# Patient Record
Sex: FEMALE | Race: WHITE | NOT HISPANIC OR LATINO | Employment: OTHER | URBAN - METROPOLITAN AREA
[De-identification: names, ages, dates, MRNs, and addresses within clinical notes are randomized per-mention and may not be internally consistent; named-entity substitution may affect disease eponyms.]

---

## 2022-02-15 ENCOUNTER — OUTSIDE PLACE OF SERVICE (OUTPATIENT)
Dept: GASTROENTEROLOGY | Facility: CLINIC | Age: 82
End: 2022-02-15
Payer: MEDICARE

## 2022-02-15 PROCEDURE — 99223 1ST HOSP IP/OBS HIGH 75: CPT | Mod: ,,, | Performed by: INTERNAL MEDICINE

## 2022-02-15 PROCEDURE — 99223 PR INITIAL HOSPITAL CARE,LEVL III: ICD-10-PCS | Mod: ,,, | Performed by: INTERNAL MEDICINE

## 2022-02-16 ENCOUNTER — OUTSIDE PLACE OF SERVICE (OUTPATIENT)
Dept: GASTROENTEROLOGY | Facility: CLINIC | Age: 82
End: 2022-02-16
Payer: MEDICARE

## 2022-02-16 PROCEDURE — 43239 EGD BIOPSY SINGLE/MULTIPLE: CPT | Mod: ,,, | Performed by: INTERNAL MEDICINE

## 2022-02-16 PROCEDURE — 43239 PR EGD, FLEX, W/BIOPSY, SGL/MULTI: ICD-10-PCS | Mod: ,,, | Performed by: INTERNAL MEDICINE

## 2022-02-18 PROCEDURE — G0180 MD CERTIFICATION HHA PATIENT: HCPCS | Mod: ,,, | Performed by: INTERNAL MEDICINE

## 2022-02-18 PROCEDURE — G0180 PR HOME HEALTH MD CERTIFICATION: ICD-10-PCS | Mod: ,,, | Performed by: INTERNAL MEDICINE

## 2022-02-21 ENCOUNTER — TELEPHONE (OUTPATIENT)
Dept: PRIMARY CARE CLINIC | Facility: CLINIC | Age: 82
End: 2022-02-21
Payer: MEDICARE

## 2022-02-21 ENCOUNTER — TELEPHONE (OUTPATIENT)
Dept: GASTROENTEROLOGY | Facility: CLINIC | Age: 82
End: 2022-02-21
Payer: MEDICARE

## 2022-02-21 NOTE — TELEPHONE ENCOUNTER
Spoke with Mery @ [facility] and Mery stated that will be faxing over the documentation that Dr Huffman requested                                ----- Message from Molly Huffman MD sent at 2/18/2022  9:00 AM CST -----  Which hospital was she admitted to? I just need her medical records, labs, medications, etc  ----- Message -----  From: Ximena Sanders MA  Sent: 2/18/2022   8:51 AM CST  To: Molly Huffman MD    Pt was referred to [facility] from [facility] [place] and the granddaughter is keeping the appointment so Mery called from [facility] to see if there is paperwork or documentation that Dr Huffman would need from them   ----- Message -----  From: Ariadne Gilmore MA  Sent: 2/17/2022   3:55 PM CST  To: Ariadne Gilmore MA, Ximena Sanders MA      ----- Message -----  From: Joie Henao  Sent: 2/17/2022  12:41 PM CST  To: Nathanael Barnes Staff    Mery calling from [facility] need to speak to nurse regarding completion of patient  paper work. Call back number [phone]. Tks

## 2022-03-18 ENCOUNTER — OFFICE VISIT (OUTPATIENT)
Dept: PRIMARY CARE CLINIC | Facility: CLINIC | Age: 82
End: 2022-03-18
Payer: MEDICARE

## 2022-03-18 ENCOUNTER — TELEPHONE (OUTPATIENT)
Dept: PRIMARY CARE CLINIC | Facility: CLINIC | Age: 82
End: 2022-03-18

## 2022-03-18 VITALS
HEIGHT: 61 IN | SYSTOLIC BLOOD PRESSURE: 90 MMHG | BODY MASS INDEX: 23.79 KG/M2 | DIASTOLIC BLOOD PRESSURE: 50 MMHG | HEART RATE: 43 BPM | OXYGEN SATURATION: 97 % | WEIGHT: 126 LBS

## 2022-03-18 DIAGNOSIS — D64.9 ANEMIA, UNSPECIFIED TYPE: ICD-10-CM

## 2022-03-18 DIAGNOSIS — Z79.899 ON AMIODARONE THERAPY: ICD-10-CM

## 2022-03-18 DIAGNOSIS — J30.9 ALLERGIC RHINITIS, UNSPECIFIED SEASONALITY, UNSPECIFIED TRIGGER: ICD-10-CM

## 2022-03-18 DIAGNOSIS — Z78.0 POST-MENOPAUSAL: ICD-10-CM

## 2022-03-18 DIAGNOSIS — K25.7 CHRONIC GASTRIC ULCER WITHOUT HEMORRHAGE AND WITHOUT PERFORATION: ICD-10-CM

## 2022-03-18 DIAGNOSIS — I42.9 CARDIOMYOPATHY, UNSPECIFIED TYPE: ICD-10-CM

## 2022-03-18 DIAGNOSIS — E87.6 HYPOKALEMIA: Primary | ICD-10-CM

## 2022-03-18 PROCEDURE — 99204 OFFICE O/P NEW MOD 45 MIN: CPT | Mod: S$GLB,,, | Performed by: INTERNAL MEDICINE

## 2022-03-18 PROCEDURE — 99204 PR OFFICE/OUTPT VISIT, NEW, LEVL IV, 45-59 MIN: ICD-10-PCS | Mod: S$GLB,,, | Performed by: INTERNAL MEDICINE

## 2022-03-18 RX ORDER — AMIODARONE HYDROCHLORIDE 200 MG/1
200 TABLET ORAL 2 TIMES DAILY
COMMUNITY
Start: 2022-02-17 | End: 2022-03-19 | Stop reason: SDUPTHER

## 2022-03-18 RX ORDER — PANTOPRAZOLE SODIUM 40 MG/1
40 TABLET, DELAYED RELEASE ORAL DAILY
COMMUNITY
Start: 2022-02-17 | End: 2022-03-19 | Stop reason: SDUPTHER

## 2022-03-18 RX ORDER — METOPROLOL SUCCINATE 100 MG/1
100 TABLET, EXTENDED RELEASE ORAL 2 TIMES DAILY
COMMUNITY
Start: 2021-10-10 | End: 2022-03-19 | Stop reason: SDUPTHER

## 2022-03-18 RX ORDER — MONTELUKAST SODIUM 10 MG/1
10 TABLET ORAL DAILY
COMMUNITY
End: 2022-03-19 | Stop reason: SDUPTHER

## 2022-03-18 RX ORDER — ROSUVASTATIN CALCIUM 10 MG/1
10 TABLET, COATED ORAL DAILY
COMMUNITY
End: 2022-03-19 | Stop reason: SDUPTHER

## 2022-03-18 RX ORDER — FUROSEMIDE 40 MG/1
40 TABLET ORAL DAILY
COMMUNITY
Start: 2022-03-11 | End: 2022-03-18 | Stop reason: ALTCHOICE

## 2022-03-18 NOTE — TELEPHONE ENCOUNTER
LVM with Patricia, nurse for Dr Berkowitz. Advised her of the BP reading today and that Dr Huffman decreased the metoprolol to once daily. Asked to call back with any questions.

## 2022-03-19 LAB
ALBUMIN SERPL-MCNC: 2.5 G/DL (ref 3.6–5.1)
ALBUMIN/GLOB SERPL: 0.7 (CALC) (ref 1–2.5)
ALP SERPL-CCNC: 197 U/L (ref 37–153)
ALT SERPL-CCNC: 13 U/L (ref 6–29)
AST SERPL-CCNC: 24 U/L (ref 10–35)
BASOPHILS # BLD AUTO: 50 CELLS/UL (ref 0–200)
BASOPHILS NFR BLD AUTO: 0.6 %
BILIRUB SERPL-MCNC: 0.5 MG/DL (ref 0.2–1.2)
BUN SERPL-MCNC: 8 MG/DL (ref 7–25)
BUN/CREAT SERPL: 6 (CALC) (ref 6–22)
CALCIUM SERPL-MCNC: 8.4 MG/DL (ref 8.6–10.4)
CHLORIDE SERPL-SCNC: 93 MMOL/L (ref 98–110)
CO2 SERPL-SCNC: 34 MMOL/L (ref 20–32)
CREAT SERPL-MCNC: 1.35 MG/DL (ref 0.6–0.88)
EOSINOPHIL # BLD AUTO: 116 CELLS/UL (ref 15–500)
EOSINOPHIL NFR BLD AUTO: 1.4 %
ERYTHROCYTE [DISTWIDTH] IN BLOOD BY AUTOMATED COUNT: 23.4 % (ref 11–15)
GLOBULIN SER CALC-MCNC: 3.5 G/DL (CALC) (ref 1.9–3.7)
GLUCOSE SERPL-MCNC: 86 MG/DL (ref 65–139)
HCT VFR BLD AUTO: 38.6 % (ref 35–45)
HGB BLD-MCNC: 11.7 G/DL (ref 11.7–15.5)
LYMPHOCYTES # BLD AUTO: 988 CELLS/UL (ref 850–3900)
LYMPHOCYTES NFR BLD AUTO: 11.9 %
MCH RBC QN AUTO: 24.9 PG (ref 27–33)
MCHC RBC AUTO-ENTMCNC: 30.3 G/DL (ref 32–36)
MCV RBC AUTO: 82.1 FL (ref 80–100)
MONOCYTES # BLD AUTO: 606 CELLS/UL (ref 200–950)
MONOCYTES NFR BLD AUTO: 7.3 %
NEUTROPHILS # BLD AUTO: 6540 CELLS/UL (ref 1500–7800)
NEUTROPHILS NFR BLD AUTO: 78.8 %
PLATELET # BLD AUTO: 245 THOUSAND/UL (ref 140–400)
PMV BLD REES-ECKER: 11 FL (ref 7.5–12.5)
POTASSIUM SERPL-SCNC: 2.9 MMOL/L (ref 3.5–5.3)
PROT SERPL-MCNC: 6 G/DL (ref 6.1–8.1)
RBC # BLD AUTO: 4.7 MILLION/UL (ref 3.8–5.1)
SERVICE CMNT-IMP: ABNORMAL
SODIUM SERPL-SCNC: 138 MMOL/L (ref 135–146)
TSH SERPL-ACNC: 2.41 MIU/L (ref 0.4–4.5)
WBC # BLD AUTO: 8.3 THOUSAND/UL (ref 3.8–10.8)

## 2022-03-19 RX ORDER — PANTOPRAZOLE SODIUM 40 MG/1
40 TABLET, DELAYED RELEASE ORAL DAILY
Qty: 30 TABLET | Refills: 1 | Status: SHIPPED | OUTPATIENT
Start: 2022-03-19 | End: 2022-05-27 | Stop reason: SDUPTHER

## 2022-03-19 RX ORDER — MONTELUKAST SODIUM 10 MG/1
10 TABLET ORAL DAILY
Qty: 30 TABLET | Refills: 2 | Status: SHIPPED | OUTPATIENT
Start: 2022-03-19

## 2022-03-19 RX ORDER — METOPROLOL SUCCINATE 100 MG/1
100 TABLET, EXTENDED RELEASE ORAL DAILY
Qty: 30 TABLET | Refills: 2 | Status: SHIPPED | OUTPATIENT
Start: 2022-03-19

## 2022-03-19 RX ORDER — POTASSIUM CHLORIDE 1.5 G/1.58G
20 POWDER, FOR SOLUTION ORAL DAILY
Qty: 30 PACKET | Refills: 1 | Status: SHIPPED | OUTPATIENT
Start: 2022-03-19 | End: 2022-04-11 | Stop reason: ALTCHOICE

## 2022-03-19 RX ORDER — ROSUVASTATIN CALCIUM 10 MG/1
10 TABLET, COATED ORAL DAILY
Qty: 90 TABLET | Refills: 3 | Status: SHIPPED | OUTPATIENT
Start: 2022-03-19

## 2022-03-19 RX ORDER — AMIODARONE HYDROCHLORIDE 200 MG/1
200 TABLET ORAL 2 TIMES DAILY
Qty: 60 TABLET | Refills: 2 | Status: SHIPPED | OUTPATIENT
Start: 2022-03-19 | End: 2022-05-27

## 2022-04-11 ENCOUNTER — OFFICE VISIT (OUTPATIENT)
Dept: PRIMARY CARE CLINIC | Facility: CLINIC | Age: 82
End: 2022-04-11
Payer: MEDICARE

## 2022-04-11 ENCOUNTER — EXTERNAL HOME HEALTH (OUTPATIENT)
Dept: HOME HEALTH SERVICES | Facility: HOSPITAL | Age: 82
End: 2022-04-11
Payer: MEDICARE

## 2022-04-11 VITALS
OXYGEN SATURATION: 99 % | WEIGHT: 114 LBS | BODY MASS INDEX: 21.52 KG/M2 | DIASTOLIC BLOOD PRESSURE: 60 MMHG | SYSTOLIC BLOOD PRESSURE: 90 MMHG | HEIGHT: 61 IN | HEART RATE: 44 BPM

## 2022-04-11 DIAGNOSIS — I42.9 CARDIOMYOPATHY, UNSPECIFIED TYPE: Primary | ICD-10-CM

## 2022-04-11 DIAGNOSIS — R41.89 COGNITIVE IMPAIRMENT: ICD-10-CM

## 2022-04-11 DIAGNOSIS — Z79.899 ON AMIODARONE THERAPY: ICD-10-CM

## 2022-04-11 PROCEDURE — 99214 PR OFFICE/OUTPT VISIT, EST, LEVL IV, 30-39 MIN: ICD-10-PCS | Mod: S$GLB,,, | Performed by: INTERNAL MEDICINE

## 2022-04-11 PROCEDURE — 99214 OFFICE O/P EST MOD 30 MIN: CPT | Mod: S$GLB,,, | Performed by: INTERNAL MEDICINE

## 2022-04-11 NOTE — PROGRESS NOTES
Subjective:      Patient ID: Kanchan Hernandez is a 82 y.o. female.    Chief Complaint: Follow-up (The patient has seen Dr Berkowitz.  MOCA done. A lot of forgetting. Can recall long term childhood but cannot recall most recent activity. )    HPI     Patient here with her granddaughter for follow up. Her K was discontinued and she was started on aldactone by Dr Berkowitz. Granddaughter is concerned patient has dementia. She seems to repeat herself. Patient has afib and is on amiodarone. Granddaughter states pulse is usually normal and patient is currently asymptomatic      Review of Systems   Constitutional: Positive for weight loss. Negative for chills and fever.        About 10 lbs since last visit, possibly from edema   Respiratory: Negative for cough, shortness of breath and wheezing.    Cardiovascular: Positive for leg swelling. Negative for chest pain and palpitations.   Genitourinary: Negative for dysuria, frequency and urgency.   Musculoskeletal: Negative for falls and joint pain.   Skin: Positive for rash.   Neurological: Negative for dizziness and headaches.   Psychiatric/Behavioral: Positive for memory loss.        Cognitive testing done in clinic     Objective:     Physical Exam  Vitals reviewed.   Constitutional:       Appearance: Normal appearance.   HENT:      Head: Normocephalic.   Eyes:      Extraocular Movements: Extraocular movements intact.      Conjunctiva/sclera: Conjunctivae normal.      Pupils: Pupils are equal, round, and reactive to light.   Cardiovascular:      Rate and Rhythm: Regular rhythm. Bradycardia present.   Pulmonary:      Effort: Pulmonary effort is normal.      Breath sounds: Normal breath sounds.   Abdominal:      General: Bowel sounds are normal.   Musculoskeletal:      Right lower leg: Edema present.      Left lower leg: Edema present.   Skin:     General: Skin is warm.      Capillary Refill: Capillary refill takes less than 2 seconds.      Comments: Stage 1 sacral pressure ulcer  "  Neurological:      General: No focal deficit present.      Mental Status: She is alert and oriented to person, place, and time.   Psychiatric:         Mood and Affect: Mood normal.        BP 90/60 (BP Location: Left arm, Patient Position: Sitting, BP Method: Small (Automatic))   Pulse (!) 44   Ht 5' 1" (1.549 m)   Wt 51.7 kg (114 lb)   SpO2 99%   BMI 21.54 kg/m²     Assessment:       ICD-10-CM ICD-9-CM   1. Cardiomyopathy, unspecified type  I42.9 425.4   2. On amiodarone therapy  Z79.899 V58.69   3. Cognitive impairment  R41.89 294.9       Plan:     Medication List with Changes/Refills   Current Medications    AMIODARONE (PACERONE) 200 MG TAB    Take 1 tablet (200 mg total) by mouth 2 (two) times daily.    METOPROLOL SUCCINATE (TOPROL-XL) 100 MG 24 HR TABLET    Take 1 tablet (100 mg total) by mouth once daily.    MONTELUKAST (SINGULAIR) 10 MG TABLET    Take 1 tablet (10 mg total) by mouth once daily.    PANTOPRAZOLE (PROTONIX) 40 MG TABLET    Take 1 tablet (40 mg total) by mouth once daily.    ROSUVASTATIN (CRESTOR) 10 MG TABLET    Take 1 tablet (10 mg total) by mouth once daily.   Discontinued Medications    POTASSIUM CHLORIDE (KLOR-CON) 20 MEQ PACK    Take 20 mEq by mouth once daily. for 30 doses        Cardiomyopathy, unspecified type    On amiodarone therapy    Cognitive impairment           Patient has a score of 25/30 on mayito cognitive test, main weakness is short term recall. She is alert and oriented x 3. I am not certain medications would improve any of her symptoms and advised all medications have side effects. We will repeat cognitive testing in 6 months. I advised granddaughter that it all depends on her and patient as far as placement is concerned. Currently granddaughter is ok with taking care of patient. Obviously patient cannot drive and should have someone take care of her medications    Recommended change in position frequently and to purchase an inflatable donut to avoid putting much " pressure on sacral area. Wrote down sensi care and advised to apply. She is already using zinc oxide cream for dermatitis from the depends that she wears       Future Appointments   Date Time Provider Department Center   6/22/2022 12:00 PM Destiney Painter MD [facility]  [place]   7/11/2022 10:00 AM Molly Huffman MD [facility]  [address]

## 2022-04-18 ENCOUNTER — TELEPHONE (OUTPATIENT)
Dept: PRIMARY CARE CLINIC | Facility: CLINIC | Age: 82
End: 2022-04-18
Payer: MEDICARE

## 2022-04-18 NOTE — TELEPHONE ENCOUNTER
Argenis is stating that they found out a lot of her dishware has high toxic levels of lead paint. She states it seems this has a lot to do with the symptoms she is having, instead of them thinking it was dementia. Argenis did inquire about doing blood work to check her levels. She also is asking should she be seen sooner since her next appt is not until July. I advised her you were out for the day and would return on tomorrow, answering her questions.  She then asked about it being urgent to be seen. I did advise her to get her checked out at the ER this evening. She states her grandmother is not wanting to go to ER's. I reiterated that you would review this on tomorrow due to being out this evening.          ----- Message from Pallavi La sent at 4/18/2022  2:40 PM CDT -----  Contact: Patient Granddaughter called-Argenis  Patient granddaughter would like for the nurse to call her she has some questions regarding the   Patient      Call back #  [phone] ask for Argenis

## 2022-04-19 DIAGNOSIS — D64.9 ANEMIA, UNSPECIFIED TYPE: Primary | ICD-10-CM

## 2022-04-20 LAB
CHOLEST SERPL-MSCNC: 110 MG/DL (ref 0–199)
HDLC SERPL-MCNC: 65 >39
LDLC SERPL CALC-MCNC: 32 <100
TRIGL SERPL-MCNC: 66 MG/DL (ref 0–149)

## 2022-04-22 ENCOUNTER — DOCUMENT SCAN (OUTPATIENT)
Dept: HOME HEALTH SERVICES | Facility: HOSPITAL | Age: 82
End: 2022-04-22
Payer: MEDICARE

## 2022-04-26 ENCOUNTER — DOCUMENT SCAN (OUTPATIENT)
Dept: HOME HEALTH SERVICES | Facility: HOSPITAL | Age: 82
End: 2022-04-26
Payer: MEDICARE

## 2022-04-26 ENCOUNTER — PATIENT OUTREACH (OUTPATIENT)
Dept: ADMINISTRATIVE | Facility: HOSPITAL | Age: 82
End: 2022-04-26
Payer: MEDICARE

## 2022-04-26 ENCOUNTER — TELEPHONE (OUTPATIENT)
Dept: PRIMARY CARE CLINIC | Facility: CLINIC | Age: 82
End: 2022-04-26

## 2022-04-26 NOTE — TELEPHONE ENCOUNTER
"[facility] with a mailbox that states "Moira." I did not leave the patient's info due to the message below stating it was a call from Isatu. I did advise to call the number back here and she can speak with Ritika or Dina if this is for an appt only.     ----- Message from Rupesh Angel sent at 4/26/2022  1:14 PM CDT -----  Contact: Isatu Lunsford from [facility] called and need a call back regarding a hospital follow up visit. Please call [phone]      "

## 2022-04-26 NOTE — PROGRESS NOTES
Working Osteo Report:     Pt due for Dexa post fracture. Called to discuss scheduling. Left message.

## 2022-04-30 PROCEDURE — G0180 PR HOME HEALTH MD CERTIFICATION: ICD-10-PCS | Mod: ,,, | Performed by: INTERNAL MEDICINE

## 2022-04-30 PROCEDURE — G0180 MD CERTIFICATION HHA PATIENT: HCPCS | Mod: ,,, | Performed by: INTERNAL MEDICINE

## 2022-05-02 ENCOUNTER — TELEPHONE (OUTPATIENT)
Dept: PRIMARY CARE CLINIC | Facility: CLINIC | Age: 82
End: 2022-05-02
Payer: MEDICARE

## 2022-05-02 NOTE — TELEPHONE ENCOUNTER
I have received a fax from [facility]  on this patient. They are advised Dr Huffman would not respond until Thurs when she returned. The office verbalized understanding.

## 2022-05-05 ENCOUNTER — EXTERNAL HOME HEALTH (OUTPATIENT)
Dept: HOME HEALTH SERVICES | Facility: HOSPITAL | Age: 82
End: 2022-05-05
Payer: MEDICARE

## 2022-05-11 ENCOUNTER — OFFICE VISIT (OUTPATIENT)
Dept: PRIMARY CARE CLINIC | Facility: CLINIC | Age: 82
End: 2022-05-11
Payer: MEDICARE

## 2022-05-11 VITALS
OXYGEN SATURATION: 97 % | SYSTOLIC BLOOD PRESSURE: 145 MMHG | WEIGHT: 119 LBS | DIASTOLIC BLOOD PRESSURE: 64 MMHG | HEART RATE: 47 BPM | BODY MASS INDEX: 22.48 KG/M2

## 2022-05-11 DIAGNOSIS — T14.8XXA HEMATOMA: Primary | ICD-10-CM

## 2022-05-11 DIAGNOSIS — I42.9 CARDIOMYOPATHY, UNSPECIFIED TYPE: ICD-10-CM

## 2022-05-11 DIAGNOSIS — E87.6 HYPOKALEMIA: ICD-10-CM

## 2022-05-11 DIAGNOSIS — S86.292A: ICD-10-CM

## 2022-05-11 DIAGNOSIS — D64.9 ANEMIA, UNSPECIFIED TYPE: ICD-10-CM

## 2022-05-11 LAB
% SATURATION: 51 % (ref 20–50)
ALBUMIN SERPL BCP-MCNC: 2.4 G/DL (ref 3.4–5)
ALBUMIN/GLOBULIN RATIO: 0.73 RATIO (ref 1.1–1.8)
ALP SERPL-CCNC: 138 U/L (ref 46–116)
ALT SERPL W P-5'-P-CCNC: 20 U/L (ref 12–78)
ANION GAP SERPL CALC-SCNC: 5 MMOL/L (ref 3–11)
ANISOCYTOSIS: NORMAL
AST SERPL-CCNC: 33 U/L (ref 15–37)
BASOPHILS NFR BLD: 1.1 % (ref 0–3)
BILIRUB SERPL-MCNC: 0.5 MG/DL (ref 0–1)
BUN SERPL-MCNC: 14 MG/DL (ref 7–18)
BUN/CREAT SERPL: 11.29 RATIO (ref 7–18)
CALCIUM SERPL-MCNC: 8.7 MG/DL (ref 8.8–10.5)
CHLORIDE SERPL-SCNC: 108 MMOL/L (ref 100–108)
CO2 SERPL-SCNC: 29 MMOL/L (ref 21–32)
CREAT SERPL-MCNC: 1.24 MG/DL (ref 0.55–1.02)
EOSINOPHIL NFR BLD: 3.8 % (ref 1–3)
ERYTHROCYTE [DISTWIDTH] IN BLOOD BY AUTOMATED COUNT: 19.3 % (ref 12.5–18)
FERRITIN SERPL-MCNC: 67 NG/ML (ref 8–388)
GFR ESTIMATION: 41
GLOBULIN: 3.3 G/DL (ref 2.3–3.5)
GLUCOSE SERPL-MCNC: 90 MG/DL (ref 70–110)
HCT VFR BLD AUTO: 33.5 % (ref 37–47)
HGB BLD-MCNC: 10.8 G/DL (ref 12–16)
IRON: 90 UG/DL (ref 26–170)
LYMPHOCYTES NFR BLD: 23.2 % (ref 25–40)
MCH RBC QN AUTO: 29.8 PG (ref 27–31.2)
MCHC RBC AUTO-ENTMCNC: 32.2 G/DL (ref 31.8–35.4)
MCV RBC AUTO: 92.5 FL (ref 80–97)
MONOCYTES NFR BLD: 7 % (ref 1–15)
NEUTROPHILS # BLD AUTO: 3.07 10*3/UL (ref 1.8–7.7)
NEUTROPHILS NFR BLD: 64.7 % (ref 37–80)
NUCLEATED RED BLOOD CELLS: 0 %
PLATELETS: 206 10*3/UL (ref 142–424)
POTASSIUM SERPL-SCNC: 3.2 MMOL/L (ref 3.6–5.2)
PROT SERPL-MCNC: 5.7 G/DL (ref 6.4–8.2)
RBC # BLD AUTO: 3.62 10*6/UL (ref 4.2–5.4)
SODIUM BLD-SCNC: 142 MMOL/L (ref 135–145)
TOTAL IRON BINDING CAPACITY: 178 UG/DL (ref 250–450)
WBC # BLD: 4.7 10*3/UL (ref 4.6–10.2)

## 2022-05-11 PROCEDURE — 99214 OFFICE O/P EST MOD 30 MIN: CPT | Mod: S$GLB,,, | Performed by: INTERNAL MEDICINE

## 2022-05-11 PROCEDURE — 99214 PR OFFICE/OUTPT VISIT, EST, LEVL IV, 30-39 MIN: ICD-10-PCS | Mod: S$GLB,,, | Performed by: INTERNAL MEDICINE

## 2022-05-11 NOTE — PROGRESS NOTES
Subjective:      Patient ID: Kanchan Hernandez is a 82 y.o. female.    Chief Complaint: Follow-up    HPI     Recently discharged from the hospital for acute to subacute stroke. She denies any acute complaints (usually doesn't have any complaints). She has an appointment with Dr Marcos for her arm.   Granddaughter states when she wipes there is some blood but she is not sure where it is coming from. She has a scheduled appointment with GI    Review of Systems   Constitutional: Negative for chills and fever.   Respiratory: Negative for cough, shortness of breath and wheezing.    Cardiovascular: Positive for leg swelling. Negative for chest pain and palpitations.   Gastrointestinal: Positive for blood in stool. Negative for abdominal pain, diarrhea, nausea and vomiting.   Genitourinary: Negative for dysuria, frequency and urgency.   Musculoskeletal: Positive for joint pain.   Neurological: Positive for weakness. Negative for dizziness, seizures, loss of consciousness and headaches.   Endo/Heme/Allergies: Bruises/bleeds easily.     Objective:     Physical Exam  Vitals reviewed.   Constitutional:       Appearance: Normal appearance.   HENT:      Head: Normocephalic.   Eyes:      Extraocular Movements: Extraocular movements intact.      Conjunctiva/sclera: Conjunctivae normal.      Pupils: Pupils are equal, round, and reactive to light.   Cardiovascular:      Rate and Rhythm: Normal rate and regular rhythm.      Pulses: Normal pulses.      Heart sounds: Murmur heard.   Pulmonary:      Effort: Pulmonary effort is normal.      Breath sounds: Normal breath sounds.   Abdominal:      General: Bowel sounds are normal.   Musculoskeletal:      Right lower leg: Edema present.      Left lower leg: Edema present.   Skin:     General: Skin is warm.      Capillary Refill: Capillary refill takes less than 2 seconds.   Neurological:      Mental Status: She is alert and oriented to person, place, and time. Mental status is at baseline.       Comments: In wheelchair   Psychiatric:         Mood and Affect: Mood normal.        BP (!) 145/64 (BP Location: Left arm, Patient Position: Sitting, BP Method: Medium (Automatic))   Pulse (!) 47   Wt 54 kg (119 lb)   SpO2 97%   BMI 22.48 kg/m²     Assessment:       ICD-10-CM ICD-9-CM   1. Hematoma  T14.8XXA 924.9   2. Anemia, unspecified type  D64.9 285.9   3. Cardiomyopathy, unspecified type  I42.9 425.4   4. Hypokalemia  E87.6 276.8   5. Other injury of muscle(s) and tendon(s) of anterior muscle group at lower leg level, left leg, initial encounter   S86.292A 959.7       Plan:     Medication List with Changes/Refills   Current Medications    AMIODARONE (PACERONE) 200 MG TAB    Take 1 tablet (200 mg total) by mouth 2 (two) times daily.    METOPROLOL SUCCINATE (TOPROL-XL) 100 MG 24 HR TABLET    Take 1 tablet (100 mg total) by mouth once daily.    MONTELUKAST (SINGULAIR) 10 MG TABLET    Take 1 tablet (10 mg total) by mouth once daily.    PANTOPRAZOLE (PROTONIX) 40 MG TABLET    Take 1 tablet (40 mg total) by mouth once daily.    ROSUVASTATIN (CRESTOR) 10 MG TABLET    Take 1 tablet (10 mg total) by mouth once daily.        Hematoma  -     US Extremity Non Vascular Limited Left; Future; Expected date: 05/11/2022    Anemia, unspecified type  -     CBC Auto Differential; Future; Expected date: 05/11/2022  -     Iron, TIBC and Ferritin Panel; Future; Expected date: 05/11/2022    Cardiomyopathy, unspecified type  -     Comprehensive Metabolic Panel; Future; Expected date: 05/11/2022    Hypokalemia  -     Comprehensive Metabolic Panel; Future; Expected date: 05/11/2022    Other injury of muscle(s) and tendon(s) of anterior muscle group at lower leg level, left leg, initial encounter   -     US Extremity Non Vascular Limited Left; Future; Expected date: 05/11/2022    Other orders  -     Morphology         Granddaughter did not bring medication bottles. Her vitals are stable but K still low. I had prescribed potassium  tablets before. Her H&H is stable but she is obviously fall risk. I feel the left leg redness is likely from a bruise etc. She has a bruise on her abdomen as well which was likely from lovenox injections    I reviewed the hospital notes and she will only be on eliquis due to her afib and h/o stroke. I will need her medication list at next visit           Future Appointments   Date Time Provider Department Center   6/22/2022 12:00 PM Destiney Painter MD [facility]  [place]   7/11/2022 10:00 AM Molly Huffman MD [facility]  [place]

## 2022-05-16 ENCOUNTER — TELEPHONE (OUTPATIENT)
Dept: PRIMARY CARE CLINIC | Facility: CLINIC | Age: 82
End: 2022-05-16
Payer: MEDICARE

## 2022-05-16 NOTE — TELEPHONE ENCOUNTER
The patient's care taker is asking should the patient still have the US you ordered on 05/11/22.     ----- Message from Viviane Culver sent at 5/16/2022  2:01 PM CDT -----  Contact: Pt/care giver Viviane  Pt care taker Viviane calling about pt ultrasound appt cancelledd because she admitted into hospital. Viviane wants to know about rescheduling appt for pt.  She can be reached at .[phone]    Former[facility], [facility],

## 2022-05-19 ENCOUNTER — TELEPHONE (OUTPATIENT)
Dept: PRIMARY CARE CLINIC | Facility: CLINIC | Age: 82
End: 2022-05-19
Payer: MEDICARE

## 2022-05-19 NOTE — TELEPHONE ENCOUNTER
Skyla with [facility]  wanted to call and give you an update. She is in the home and the patient has a lidya HR of 52, irregular, and is in the upper 40's to 52. She is asymptomatic. Skyla states she does know the patient refused the pacemaker.  [phone].

## 2022-05-24 ENCOUNTER — DOCUMENT SCAN (OUTPATIENT)
Dept: HOME HEALTH SERVICES | Facility: HOSPITAL | Age: 82
End: 2022-05-24
Payer: MEDICARE

## 2022-05-27 ENCOUNTER — OFFICE VISIT (OUTPATIENT)
Dept: PRIMARY CARE CLINIC | Facility: CLINIC | Age: 82
End: 2022-05-27
Payer: MEDICARE

## 2022-05-27 VITALS
BODY MASS INDEX: 20.97 KG/M2 | DIASTOLIC BLOOD PRESSURE: 66 MMHG | SYSTOLIC BLOOD PRESSURE: 91 MMHG | WEIGHT: 111 LBS | OXYGEN SATURATION: 97 % | HEART RATE: 109 BPM

## 2022-05-27 DIAGNOSIS — I42.9 CARDIOMYOPATHY, UNSPECIFIED TYPE: ICD-10-CM

## 2022-05-27 DIAGNOSIS — Z86.73 H/O: STROKE: ICD-10-CM

## 2022-05-27 DIAGNOSIS — K25.7 CHRONIC GASTRIC ULCER WITHOUT HEMORRHAGE AND WITHOUT PERFORATION: ICD-10-CM

## 2022-05-27 DIAGNOSIS — E87.6 HYPOKALEMIA: Primary | ICD-10-CM

## 2022-05-27 DIAGNOSIS — R41.89 COGNITIVE IMPAIRMENT: ICD-10-CM

## 2022-05-27 DIAGNOSIS — Z79.899 ON AMIODARONE THERAPY: ICD-10-CM

## 2022-05-27 PROCEDURE — 99214 OFFICE O/P EST MOD 30 MIN: CPT | Mod: S$GLB,,, | Performed by: INTERNAL MEDICINE

## 2022-05-27 PROCEDURE — 99214 PR OFFICE/OUTPT VISIT, EST, LEVL IV, 30-39 MIN: ICD-10-PCS | Mod: S$GLB,,, | Performed by: INTERNAL MEDICINE

## 2022-05-27 RX ORDER — CLOPIDOGREL BISULFATE 75 MG/1
75 TABLET ORAL DAILY
COMMUNITY
Start: 2022-04-29

## 2022-05-27 RX ORDER — CALCIUM CARBONATE 600 MG
600 TABLET ORAL ONCE
COMMUNITY

## 2022-05-27 RX ORDER — DABIGATRAN ETEXILATE MESYLATE 75 MG/1
75 CAPSULE ORAL 2 TIMES DAILY
COMMUNITY
Start: 2022-05-26

## 2022-05-27 RX ORDER — ACETAMINOPHEN 500 MG
5000 TABLET ORAL DAILY
COMMUNITY

## 2022-05-27 RX ORDER — FERROUS SULFATE 325(65) MG
325 TABLET ORAL DAILY
COMMUNITY
Start: 2022-02-17 | End: 2022-05-27 | Stop reason: SDUPTHER

## 2022-05-27 RX ORDER — PANTOPRAZOLE SODIUM 40 MG/1
40 TABLET, DELAYED RELEASE ORAL DAILY
Qty: 30 TABLET | Refills: 1 | Status: SHIPPED | OUTPATIENT
Start: 2022-05-27

## 2022-05-27 RX ORDER — FERROUS SULFATE 325(65) MG
325 TABLET ORAL DAILY
Qty: 90 TABLET | Refills: 3 | Status: SHIPPED | OUTPATIENT
Start: 2022-05-27

## 2022-05-27 NOTE — PROGRESS NOTES
Subjective:      Patient ID: Kanchan Hernandez is a 82 y.o. female.    Chief Complaint: Hospital Follow Up    HPI     Here for follow up. She apparently passed out again and it was attributed to her bradycardia and she continues to refuse a PM. Her blood count was stable at the last visit and the bruise on her left leg is improving. Patient herself does not have any complaints.       Review of Systems   Constitutional: Negative for chills and fever.   Respiratory: Negative for cough, shortness of breath and wheezing.    Cardiovascular: Negative for chest pain, palpitations and leg swelling.   Gastrointestinal: Negative for abdominal pain, constipation, diarrhea, nausea and vomiting.   Genitourinary: Negative for dysuria, frequency and urgency.   Musculoskeletal: Positive for falls.   Skin: Negative for rash.   Neurological: Negative for dizziness and headaches.   Psychiatric/Behavioral: Positive for memory loss. Negative for depression. The patient is not nervous/anxious.      Objective:     Physical Exam  Vitals reviewed.   Constitutional:       Appearance: Normal appearance.   HENT:      Head: Normocephalic.   Eyes:      Extraocular Movements: Extraocular movements intact.      Conjunctiva/sclera: Conjunctivae normal.      Pupils: Pupils are equal, round, and reactive to light.   Cardiovascular:      Rate and Rhythm: Normal rate and regular rhythm.      Heart sounds: Murmur heard.   Pulmonary:      Effort: Pulmonary effort is normal.      Breath sounds: Normal breath sounds.   Abdominal:      General: Bowel sounds are normal.   Musculoskeletal:      Right lower leg: No edema.      Left lower leg: No edema.   Skin:     General: Skin is warm.      Capillary Refill: Capillary refill takes less than 2 seconds.      Findings: Bruising present.   Neurological:      General: No focal deficit present.      Mental Status: She is alert.   Psychiatric:         Mood and Affect: Mood normal.        BP 91/66 (BP Location: Left  arm, Patient Position: Sitting, BP Method: Medium (Automatic))   Pulse 109   Wt 50.3 kg (111 lb)   SpO2 97%   BMI 20.97 kg/m²     Assessment:       ICD-10-CM ICD-9-CM   1. Hypokalemia  E87.6 276.8   2. Chronic gastric ulcer without hemorrhage and without perforation  K25.7 531.70   3. Cardiomyopathy, unspecified type  I42.9 425.4   4. H/O: stroke  Z86.73 V12.54       Plan:     Medication List with Changes/Refills   New Medications    POTASSIUM CHLORIDE (MICRO-K) 10 MEQ CPSR    Take 1 capsule (10 mEq total) by mouth once daily. for 30 doses   Current Medications    AMIODARONE (PACERONE) 200 MG TAB    Take 1 tablet (200 mg total) by mouth 2 (two) times daily.    CALCIUM CARBONATE (OS-MARCIE) 600 MG CALCIUM (1,500 MG) TAB    Take 600 mg by mouth once.    CHOLECALCIFEROL, VITAMIN D3, (VITAMIN D3) 125 MCG (5,000 UNIT) TAB    Take 5,000 Units by mouth once daily.    CLOPIDOGREL (PLAVIX) 75 MG TABLET    Take 75 mg by mouth once daily.    METOPROLOL SUCCINATE (TOPROL-XL) 100 MG 24 HR TABLET    Take 1 tablet (100 mg total) by mouth once daily.    MONTELUKAST (SINGULAIR) 10 MG TABLET    Take 1 tablet (10 mg total) by mouth once daily.    PRADAXA 75 MG CAP    Take 75 mg by mouth 2 (two) times a day.    ROSUVASTATIN (CRESTOR) 10 MG TABLET    Take 1 tablet (10 mg total) by mouth once daily.   Changed and/or Refilled Medications    Modified Medication Previous Medication    FERROUS SULFATE (FEOSOL) 325 MG (65 MG IRON) TAB TABLET ferrous sulfate (FEOSOL) 325 mg (65 mg iron) Tab tablet       Take 1 tablet (325 mg total) by mouth once daily.    Take 325 mg by mouth once daily.    PANTOPRAZOLE (PROTONIX) 40 MG TABLET pantoprazole (PROTONIX) 40 MG tablet       Take 1 tablet (40 mg total) by mouth once daily.    Take 1 tablet (40 mg total) by mouth once daily.        Hypokalemia  -     potassium chloride (MICRO-K) 10 MEQ CpSR; Take 1 capsule (10 mEq total) by mouth once daily. for 30 doses  Dispense: 30 capsule; Refill: 0    Chronic  gastric ulcer without hemorrhage and without perforation  -     pantoprazole (PROTONIX) 40 MG tablet; Take 1 tablet (40 mg total) by mouth once daily.  Dispense: 30 tablet; Refill: 1  -     ferrous sulfate (FEOSOL) 325 mg (65 mg iron) Tab tablet; Take 1 tablet (325 mg total) by mouth once daily.  Dispense: 90 tablet; Refill: 3    Cardiomyopathy, unspecified type  -     Ambulatory referral/consult to Hospice; Future; Expected date: 06/10/2022    H/O: stroke  -     Ambulatory referral/consult to Hospice; Future; Expected date: 06/10/2022         Patient with multiple co morbidities and increased risk for fall. I looked at her discharge summary and it seems she needs to be on aspirin, plavix and eliquis. She has multiple episodes of syncope/falls and will be increased risk for bleeding. Her granddaughter helps her in the house but because of her multiple hospitalizations she would be a good candidate for palliative care/hospice      Future Appointments   Date Time Provider Department Center   6/13/2022 11:40 AM Molly Huffman MD [facility]   6/22/2022 12:00 PM Destiney Painter MD [facility]  [place]   7/11/2022 10:00 AM Molly Huffman MD [facility] [place]

## 2022-05-28 RX ORDER — POTASSIUM CHLORIDE 750 MG/1
10 CAPSULE, EXTENDED RELEASE ORAL DAILY
Qty: 30 CAPSULE | Refills: 0 | Status: SHIPPED | OUTPATIENT
Start: 2022-05-28 | End: 2022-06-27

## 2022-06-03 ENCOUNTER — TELEPHONE (OUTPATIENT)
Dept: PRIMARY CARE CLINIC | Facility: CLINIC | Age: 82
End: 2022-06-03
Payer: MEDICARE

## 2022-06-03 DIAGNOSIS — Z86.73 H/O: STROKE: Primary | ICD-10-CM

## 2022-06-03 DIAGNOSIS — I42.9 CARDIOMYOPATHY, UNSPECIFIED TYPE: ICD-10-CM

## 2022-06-03 NOTE — TELEPHONE ENCOUNTER
Called and advised this was a mistake. Sending to [facility].     ----- Message from Saba Caballero sent at 6/3/2022 10:09 AM CDT -----  [facility]  Requesting a call back regarding referral they received - needs to confirm it's for them because she's not sure. Please call back at [phone]

## 2022-06-10 ENCOUNTER — PATIENT OUTREACH (OUTPATIENT)
Dept: ADMINISTRATIVE | Facility: HOSPITAL | Age: 82
End: 2022-06-10
Payer: MEDICARE

## 2022-06-10 NOTE — LETTER
Abigail 10, 2022                 [facility]  [address]  [facility] [address]  Phone: [phone] Abigail 10, 2022     Patient: Kanchan Hernandez    YOB: 1940   Date of Visit: 6/10/2022   To whom it may concern     We are seeing Kanchan Hernandez, YOB: 1940, at [facility]. Molly Huffman MD is their primary care physician. To help with our [facility] maintenance records could you please send the following:     Most recent Dexa Scan Result with recommendations to repeat procedure.    Please send fax to [phone], Attention Gabriella ROGERS LPN Care Coordination.    Thank-you in advance for your assistance. If you have any questions or concerns, please don't hesitate to contact me at [phone].     Gabriella ROGERS [facility]  Care Coordination Department  [facility]  Phone Number: [phone]

## 2022-06-10 NOTE — PROGRESS NOTES
Uploading and hyper-linking Lipid panel done 4.20.2022  Pt has her Dexa scans performed at [facility]. Fax request sent for records.   Received fax back from [facility]. Last Dexascan pt had there was in 2014. No recent Bone Density Scans in [facility]. Pt has had multiple x-rays performed but no Dexa scan.

## 2022-06-14 ENCOUNTER — DOCUMENT SCAN (OUTPATIENT)
Dept: HOME HEALTH SERVICES | Facility: HOSPITAL | Age: 82
End: 2022-06-14
Payer: MEDICARE

## 2022-07-05 ENCOUNTER — DOCUMENT SCAN (OUTPATIENT)
Dept: HOME HEALTH SERVICES | Facility: HOSPITAL | Age: 82
End: 2022-07-05
Payer: MEDICARE